# Patient Record
(demographics unavailable — no encounter records)

---

## 2024-11-19 NOTE — HISTORY OF PRESENT ILLNESS
[Sudden] : sudden [6] : 6 [5] : 5 [Dull/Aching] : dull/aching [Throbbing] : throbbing [Intermittent] : intermittent [Household chores] : household chores [Leisure] : leisure [Social interactions] : social interactions [Rest] : rest [Part time] : Work status: part time [de-identified] : Patient here for right hp. Patient states NKI. Patient states pain started 2 years ago. Patient states pain in the bursa area of the hip. Patient states the pain is aching with weight bearing and ROM that is constant. Patient states she had IA CSI 2 years with great relief until recently.  [] : Post Surgical Visit: no [FreeTextEntry1] : Right hip  [FreeTextEntry3] : 2 years ago  [FreeTextEntry5] : NKI  [de-identified] : Movement  [de-identified] :

## 2024-11-19 NOTE — ASSESSMENT
[FreeTextEntry1] : 59 yo female presenting today with right greater trochanteric hip bursitis, gluteus medius tendinitis. X-rays demonstrating mild hip dysplasia, moderate hip oa with cam/pincer lesions. Patient denies groin pain. Patient had right hip intraarticular csi 2 years ago with relief of pain. Recommend non-surgical management -Discussed with patient non-surgical modalities of rest, ice, NSAIDs, activity modifications, csi. PAtietn is interested in right greater trochanteric hip bursal csi, patient tolerated well -Activities as tolerated -Rest, ice, compression, elevation, NSAIDs PRN for pain.  -All questions answered -F/u PRN or 6 weeks  The diagnosis was explained in detail. The potential non-surgical and surgical treatments were reviewed. The relative risks and benefits of each option were considered relative to the patients age, activity level, medical history, symptom severity and previously attempted treatments.  The patient was advised to consult with their primary medical provider prior to initiation of any new medications to reduce the risk of adverse effects specific to their long-term home medications and medical history. The risk of gastrointestinal irritation and kidney injury specific to long-term NSAID use was discussed.  Entered by Rudolph Hudson PA-C acting as scribe. Dr. Watson Attestation The documentation recorded by the scribe, in my presence, accurately reflects the service I, Dr. Watson, personally performed, and the decisions made by me with my edits as appropriate.

## 2024-11-19 NOTE — PHYSICAL EXAM
[de-identified] : Examination of the right hip is as follows:  INSPECTION: no swelling, no ecchymosis, no erythema, no scars, no palpable masses.  PALPATION: tenderness, greater trochanteric tenderness, gluteus medius tenderness, no ttp over groin ROM: pain with hip abduction, but flexion 120 degrees, extension 30 degrees, adduction 35 degrees, abduction 45 degrees, external rotation 45 degrees, internal rotation 45 degrees.  STRENGTH: flexion 5/5, extension 5/5, abduction 5/5, adduction 5/5.  VASCULAR: dorsalis pedis pulse: 2+, posterior tibialis pulse: 2+.  NEURO: motor exam 5/5 throughout, light touch intact throughout, no focal motor deficits.  GAIT: non-antalgic, patient ambulates without assistive device.  X-rays of right hip is as follows: Hip with pelvis 2 view in the anteroposterior, lateral views: Moderate hip oa with cam/pincer lesions, mild hip dysplasia. There are no fractures, subluxations or dislocations. No significant abnormalities are seen.

## 2024-12-24 NOTE — ASSESSMENT
[FreeTextEntry1] : A discussion regarding available pain management treatment options occurred with the patient.  These included interventional, rehabilitative, pharmacological, and alternative modalities. We will proceed with the following:    Interventional treatment options:  - Proceed with right PM L5-S1 LESI (80 mg Kenalog) with fluoroscopic guidance - Can repeat TPI for cervical myofascial pain component on as-needed basis - Can consider cervical facet directed intervention for ongoing axial neck pain; informational materials provided at prior visit - see additional instructions below    Rehabilitative options:   - Completed recent physical therapy trial for cervical/lumbar spine - Active participation in HEP was encouraged as tolerated - Home exercise sheet provided at prior visit  Medication based treatment options:   - Continue gabapentin 600 mg TID; further titration based on clinical response - continue meloxicam 7.5-15 mg daily as needed - see additional instructions below    Complementary treatment options:   - Weight management and lifestyle modifications discussed     Additional treatment recommendations as follows:   - Advised orthopedic spine surgical follow-up as directed - Follow up 1-2 weeks post injection for assessment of efficacy and further treatment recommendations  The risks, benefits and alternatives of the proposed procedure were explained in detail with the patient. The risks outlined include but are not limited to infection, bleeding, post- dural puncture headache, nerve injury, a temporary increase in pain, failure to resolve symptoms, need for future interventions, allergic reaction, and possible elevation of blood sugar in diabetics if using corticosteroid.  All questions were answered to patient's apparent satisfaction, and he/she verbalized an understanding.  We have discussed the risks, benefits, and alternatives for NSAID therapy including but not limited to the risk of bleeding, thrombosis, gastric mucosal irritation/ulceration, allergic reaction and kidney dysfunction.  The patient verbalizes an understanding.  I, Luz Heck, acting as scribe, attest that this documentation has been prepared under the direction and in the presence of Provider Kaz Nichole DO.  The documentation recorded by the scribe, in my presence, accurately reflects the service I personally performed, and the decisions made by me with my edits as appropriate.

## 2024-12-24 NOTE — HISTORY OF PRESENT ILLNESS
[FreeTextEntry1] : 2024 - Patient presents for 3-month FUV regarding their neck and back pain. Patient reports she completed PT for her neck with benefit. Her neck symptoms have returned to baseline, and she feels they are now manageable.  Also feels that she benefited from trigger point injections performed at her last visit.  Her lower back pain is now her concern. She has pain across the lower back with radiation down the right leg to the outside of her calf and toes on her right foot (legs>back).  She got several months of sustained relief from her last lumbar AALIYAH.   2024 - Patient presents for follow-up visit and cervical spine MRI review.  Continues to report focal left sided neck pain without radiation to the upper extremities.  Reports that symptoms are exacerbated with looking down for prolonged time.  9/10/2024 - Patient presents for FUV after a Right L4-5, L5-S1 TFESI on 24. She reports a complete resolution of her lower extremity pain.  Overall greater than 80% relief.  She is pleased with her response.  Unfortunately, patient complains of increasing neck pain.  Has been a longstanding issue however has been increasing in intensity as of late.  Has undergone previous trials of chiropractic and acupuncture therapy without benefit.  She continues on gabapentin.  She denies any upper extremity weakness, bladder/bowel dysfunction, or difficulty with fine motor movements of the hands.  2024 - Patient presents for FUV after a right L4-5, L5-S1 TFESI on 2024. Patient reports a significant reduction of pain s/p epidural, and she continues to have sustained relief, but her lower back and radicular right leg and foot pain is starting to return; she continues to take 1800 mg of gabapentin daily.  Was previously indicated for lumbar spine surgery however she had to put off these plans as she was caring for an ill family member.  2024 - The patient presents for initial evaluation regarding their low back pain. Patient was referred by Dr. Mcfadden. Patient reports an acute flare-up of pain starting about a week ago; her pain is in the right hip radiating to the anterior right thigh down the leg into dorsal aspect of the right foot, she has paresthesias in the right leg and foot as well and denies any lower back pain. She was hospitalized for the pain ([facility]), and was given dilaudid, and an oral steroid pack which provided minimal pain relief.   Injections: 1) Right L4-5, L5-S1 TFESI (2024, 24) 2) TPI's  Pertinent Surgical History: N/A   Imagin) MRI Lumbar Spine (1/15/2024) - [facility]  2) MRI cervical spine (2024) -  [facility]  Physician Disclaimer: I have personally reviewed and confirmed all HPI data with the patient.

## 2024-12-24 NOTE — HISTORY OF PRESENT ILLNESS
[FreeTextEntry1] : 2024 - Patient presents for 3-month FUV regarding their neck and back pain. Patient reports she completed PT for her neck with benefit. Her neck symptoms have returned to baseline, and she feels they are now manageable.  Also feels that she benefited from trigger point injections performed at her last visit.  Her lower back pain is now her concern. She has pain across the lower back with radiation down the right leg to the outside of her calf and toes on her right foot (legs>back).  She got several months of sustained relief from her last lumbar AALIYAH.   2024 - Patient presents for follow-up visit and cervical spine MRI review.  Continues to report focal left sided neck pain without radiation to the upper extremities.  Reports that symptoms are exacerbated with looking down for prolonged time.  9/10/2024 - Patient presents for FUV after a Right L4-5, L5-S1 TFESI on 24. She reports a complete resolution of her lower extremity pain.  Overall greater than 80% relief.  She is pleased with her response.  Unfortunately, patient complains of increasing neck pain.  Has been a longstanding issue however has been increasing in intensity as of late.  Has undergone previous trials of chiropractic and acupuncture therapy without benefit.  She continues on gabapentin.  She denies any upper extremity weakness, bladder/bowel dysfunction, or difficulty with fine motor movements of the hands.  2024 - Patient presents for FUV after a right L4-5, L5-S1 TFESI on 2024. Patient reports a significant reduction of pain s/p epidural, and she continues to have sustained relief, but her lower back and radicular right leg and foot pain is starting to return; she continues to take 1800 mg of gabapentin daily.  Was previously indicated for lumbar spine surgery however she had to put off these plans as she was caring for an ill family member.  2024 - The patient presents for initial evaluation regarding their low back pain. Patient was referred by Dr. Mcfadden. Patient reports an acute flare-up of pain starting about a week ago; her pain is in the right hip radiating to the anterior right thigh down the leg into dorsal aspect of the right foot, she has paresthesias in the right leg and foot as well and denies any lower back pain. She was hospitalized for the pain ([facility]), and was given dilaudid, and an oral steroid pack which provided minimal pain relief.   Injections: 1) Right L4-5, L5-S1 TFESI (2024, 24) 2) TPI's  Pertinent Surgical History: N/A   Imagin) MRI Lumbar Spine (1/15/2024) - [facility]  2) MRI cervical spine (2024) -  [facility]  Physician Disclaimer: I have personally reviewed and confirmed all HPI data with the patient.

## 2024-12-24 NOTE — PHYSICAL EXAM
[de-identified] : Constitutional:   - No acute distress   - Well developed; well nourished    Neurological:   - normal mood and affect   - alert and oriented x 3     Cardiovascular:   - grossly normal   Lumbar Spine Exam:   Inspection:  erythema (-)  ecchymosis (-)  rashes (-)  alignment: no scoliosis   Palpation:  Midline lumbar tenderness:            (-)  midline thoracic tenderness:          (-)  Lumbar paraspinal tenderness:  L (-) ; R (-)  thoracic paraspinal tenderness: L (-) ; R (-)  sciatic nerve tenderness :          L (-) ; R (-)  SI joint tenderness:                     L (-) ; R (-)  GTB tenderness:                        L (-);  R (-)   ROM: WNL Pain with extremes of extension  Strength:                                     Right       Left     Hip Flexion:                (5/5)       (5/5)  Quadriceps:               (5/5)       (5/5)  Hamstrings:                (5/5)       (5/5)  Ankle Dorsiflexion:     (5/5)       (5/5)  EHL:                           (5/5)       (5/5)  Ankle Plantarflexion:  (5/5)       (5/5)   Special Tests:  SLR:                            R (+) ; L (-)  Facet loading:             R (+) ; L (+)  JACEY test:                R (n/a) ; L (n/a)  Hamstring tightness:   R (-);  L (-)   Neurologic:  SILT throughout right lower extremity  SILT throughout left lower extremity   Reflexes normal and symmetric bilateral lower extremities   Gait:  non-antalgic gait   Cervical Spine Exam:   Inspection: erythema (-) ecchymosis (-) rashes (-)   Palpation:                                               Cervical paraspinal tenderness:         R (-); L (-) Upper trapezius tenderness:              R (-); L (-) Rhomboids tenderness:                      R (-); L (-) Occipital Ridge:                                   R (-); L (-) Supraspinatus tenderness:                 R (-); L (-)   ROM: WNL   Strength Testing:            Right    Left Deltoid                             (5/5)    (5/5) Biceps:                            (5/5)    (5/5) Triceps:                           (5/5)    (5/5) Finger Abductors:           (5/5)    (5/5) Grasp:                             (5/5)    (5/5)   Special Testing: Spurling Test:                  R (-); L (-) Facet load test:               R (-); L (+) Gordon test:                  R (-); L (-)   Neuro: SILT throughout right upper extremity SI LT throughout left upper extremity   Reflexes: Biceps   -           R (2+); L (2+) Triceps  -           R (2+); L (2+) Brachioradialis- R (2+); L (2+)   No ankle clonus

## 2025-01-08 NOTE — PROCEDURE
[FreeTextEntry3] : Large joint injection was performed of the right greater trochanteric bursa. The indication for this procedure was pain and inflammation. The site was prepped with alcohol and sterile technique used. An injection of was used Betamethasone (Celestone) 5cc of 6mg. Patient tolerated procedure well. Patient was advised to call if redness, pain or fever occur and apply ice for 15 minutes out of every hour for the next 12-24 hours as tolerated. Patient has tried OTC's including aspirin, Ibuprofen, Aleve, etc or prescription NSAIDS, and/or exercises at home and/or physical therapy without satisfactory response, patient had decreased mobility in the joint and the risks benefits, and alternatives have been discussed, and verbal consent was obtained. Klisyri Pregnancy And Lactation Text: It is unknown if this medication can harm a developing fetus or if it is excreted in breast milk.

## 2025-01-08 NOTE — ASSESSMENT
[FreeTextEntry1] : 59 yo female presenting today with right greater trochanteric hip bursitis, gluteus medius tendinitis. X-rays demonstrating mild hip dysplasia, moderate hip oa with cam/pincer lesions. Patient denies groin pain. Patient had right hip bursitis injection last visit with relief of pain.  Now with recurrence of pain and requesting repeat CSI today. Recommend non-surgical management -Discussed with patient non-surgical modalities of rest, ice, NSAIDs, activity modifications, csi. Patient is interested in right greater trochanteric hip bursal csi, patient tolerated well -Activities as tolerated -Rest, ice, compression, elevation, NSAIDs PRN for pain.  -All questions answered -F/u PRN or 6 weeks  The diagnosis was explained in detail. The potential non-surgical and surgical treatments were reviewed. The relative risks and benefits of each option were considered relative to the patients age, activity level, medical history, symptom severity and previously attempted treatments.  The patient was advised to consult with their primary medical provider prior to initiation of any new medications to reduce the risk of adverse effects specific to their long-term home medications and medical history. The risk of gastrointestinal irritation and kidney injury specific to long-term NSAID use was discussed.

## 2025-01-08 NOTE — HISTORY OF PRESENT ILLNESS
[Sudden] : sudden [6] : 6 [5] : 5 [Dull/Aching] : dull/aching [Sharp] : sharp [Throbbing] : throbbing [Constant] : constant [Household chores] : household chores [Leisure] : leisure [Social interactions] : social interactions [Rest] : rest [Part time] : Work status: part time [de-identified] : Patient here for right hp. Patient being treated for greater trochanteric hip bursitis, gluteus medius tendinitis. Patient had CSI on 11/19/2024. Patient states injection helped for 3 weeks then pain returned. Patient states pain is sharp and constant. Patient states walking upstairs gives her the worst pain. Patient would like to discuss options.  [] : Post Surgical Visit: no [FreeTextEntry1] : Right hip  [FreeTextEntry3] : 2 years ago  [FreeTextEntry5] : NKI  [de-identified] : Movement  [de-identified] :   [de-identified] : 11/19/2024 [de-identified] : Right hip [de-identified] : Cortisone

## 2025-01-08 NOTE — PHYSICAL EXAM
[de-identified] : Examination of the right hip is as follows:  INSPECTION: no swelling, no ecchymosis, no erythema, no scars, no palpable masses.  PALPATION: tenderness, greater trochanteric tenderness, gluteus medius tenderness, no ttp over groin ROM: pain with hip abduction, but flexion 120 degrees, extension 30 degrees, adduction 35 degrees, abduction 45 degrees, external rotation 45 degrees, internal rotation 45 degrees.  STRENGTH: flexion 5/5, extension 5/5, abduction 5/5, adduction 5/5.  VASCULAR: dorsalis pedis pulse: 2+, posterior tibialis pulse: 2+.  NEURO: motor exam 5/5 throughout, light touch intact throughout, no focal motor deficits.  GAIT: non-antalgic, patient ambulates without assistive device.  X-rays of right hip is as follows: Hip with pelvis 2 view in the anteroposterior, lateral views: Moderate hip oa with cam/pincer lesions, mild hip dysplasia. There are no fractures, subluxations or dislocations. No significant abnormalities are seen.

## 2025-01-17 NOTE — PROCEDURE
[FreeTextEntry3] : Date of Service: 01/17/2025   Account: [id number]  Patient: SHY WALTERS   YOB: 1964  Age: 60 year  Surgeon:      Kaz Nichole DO  Assistant:    None  Pre-Operative Diagnosis:         Lumbosacral Radiculitis (M54.17)  Post Operative Diagnosis:       Lumbosacral Radiculitis (M54.17)     Procedure:             Lumbar interlaminar (L5-S1) epidural steroid injection under fluoroscopic guidance  Anesthesia:            MAC  This procedure was carried out using fluoroscopic guidance.  The risks and benefits of the procedure were discussed extensively with the patient.  The consent of the patient was obtained and the following procedure was performed.  A timeout was performed with all essential staff present and the site and side were verified.  The patient was placed in the prone position with a pillow under the abdomen to minimize the lumbar lordosis.  The lumbar area was prepped and draped in a sterile fashion.  Under A/P view with slight cephalad-caudad angulation, the L5-S1 interspace was identified and marked.  Using sterile technique, the superficial skin was anesthetized with 1% Lidocaine.  A 20-gauge Tuohy needle was advanced into the epidural space under fluoroscopy using loss of resistance at the L5-S1 level.  After negative aspiration for heme or CSF, an epidurogram was obtained in the A/P and lateral fluoroscopic views using 2-3 cc of Omnipaque contrast confirming epidural placement of the needle.  After this, 5 cc of a mixture of preservative free normal saline and 80 mg of Kenalog were injected into the epidural space.   Vital signs remained normal throughout the procedure.  The patient tolerated the procedure well.  There were no immediate complications from the performed procedure.  The patient was instructed to apply ice over the injection sites for twenty minutes every two hours for the next 24 hours.  Disposition:      1. The patient was advised to F/U in 1-2 weeks to assess the response to the injection.      2. The patient was also instructed to contact me immediately if there were any concerns related to the procedure performed.

## 2025-03-22 NOTE — PHYSICAL EXAM
[facility] CARDIOLOGY PROGRESS NOTE       Date: 3/22/2025 Admission Date: 3/20/2025   YOB: 1973 Attending: Eden Turcios*   MRN: [id number]      FOLLOW UP ON: 03/22/25    S:     Frank Townsend is a 51 [facility] Axis (Degrees) 50       R Axis (Degrees) 8       T Axis (Degrees) 31       REPORT TEXT           Normal sinus rhythm  Normal ECG  No previous ECGs available  Confirmed by IVANA GREEN MD ([id number]),  Ginger Azevedo ([id number]) Axis (Degrees) 50     R Axis (Degrees) 8     T Axis (Degrees) 31     REPORT TEXT       Normal sinus rhythm  Normal ECG  No previous ECGs available  Confirmed by IVANA GREEN MD ([id number]),  Ginger Azevedo ([id number]) de-identified] : Constitutional:   - No acute distress   - Well developed; well nourished    Neurological:   - normal mood and affect   - alert and oriented x 3     Cardiovascular:   - grossly normal   Lumbar Spine Exam:   Inspection:  erythema (-)  ecchymosis (-)  rashes (-)  alignment: no scoliosis   Palpation:  Midline lumbar tenderness:            (-)  midline thoracic tenderness:          (-)  Lumbar paraspinal tenderness:  L (-) ; R (-)  thoracic paraspinal tenderness: L (-) ; R (-)  sciatic nerve tenderness :          L (-) ; R (-)  SI joint tenderness:                     L (-) ; R (-)  GTB tenderness:                        L (-);  R (-)   ROM: WNL Pain with extremes of extension  Strength:                                     Right       Left     Hip Flexion:                (5/5)       (5/5)  Quadriceps:               (5/5)       (5/5)  Hamstrings:                (5/5)       (5/5)  Ankle Dorsiflexion:     (5/5)       (5/5)  EHL:                           (5/5)       (5/5)  Ankle Plantarflexion:  (5/5)       (5/5)   Special Tests:  SLR:                            R (+) ; L (-)  Facet loading:             R (+) ; L (+)  JACEY test:                R (n/a) ; L (n/a)  Hamstring tightness:   R (-);  L (-)   Neurologic:  SILT throughout right lower extremity  SILT throughout left lower extremity   Reflexes normal and symmetric bilateral lower extremities   Gait:  non-antalgic gait   Cervical Spine Exam:   Inspection: erythema (-) ecchymosis (-) rashes (-)   Palpation:                                               Cervical paraspinal tenderness:         R (-); L (-) Upper trapezius tenderness:              R (-); L (-) Rhomboids tenderness:                      R (-); L (-) Occipital Ridge:                                   R (-); L (-) Supraspinatus tenderness:                 R (-); L (-)   ROM: WNL   Strength Testing:            Right    Left Deltoid                             (5/5)    (5/5) Biceps:                            (5/5)    (5/5) Triceps:                           (5/5)    (5/5) Finger Abductors:           (5/5)    (5/5) Grasp:                             (5/5)    (5/5)   Special Testing: Spurling Test:                  R (-); L (-) Facet load test:               R (-); L (+) Gordon test:                  R (-); L (-)   Neuro: SILT throughout right upper extremity SI LT throughout left upper extremity   Reflexes: Biceps   -           R (2+); L (2+) Triceps  -           R (2+); L (2+) Brachioradialis- R (2+); L (2+)   No ankle clonus